# Patient Record
Sex: FEMALE | Race: WHITE
[De-identification: names, ages, dates, MRNs, and addresses within clinical notes are randomized per-mention and may not be internally consistent; named-entity substitution may affect disease eponyms.]

---

## 2019-10-20 ENCOUNTER — HOSPITAL ENCOUNTER (EMERGENCY)
Dept: HOSPITAL 61 - ER | Age: 49
Discharge: HOME | End: 2019-10-20
Payer: COMMERCIAL

## 2019-10-20 VITALS — BODY MASS INDEX: 28.93 KG/M2 | WEIGHT: 180 LBS | HEIGHT: 66 IN

## 2019-10-20 VITALS — DIASTOLIC BLOOD PRESSURE: 77 MMHG | SYSTOLIC BLOOD PRESSURE: 129 MMHG
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
## 2019-10-20 DIAGNOSIS — S82.62XA: Primary | ICD-10-CM

## 2019-10-20 PROCEDURE — 73610 X-RAY EXAM OF ANKLE: CPT

## 2019-10-20 PROCEDURE — 29515 APPLICATION SHORT LEG SPLINT: CPT

## 2019-10-20 PROCEDURE — 73630 X-RAY EXAM OF FOOT: CPT

## 2019-10-20 NOTE — RAD
Three views ANKLE LEFT 3V, FOOT LEFT 3V

 

Clinical History: Pain and swelling

 

Comparison: None.

 

3 views left foot:

 

The visualized osseous structures appear normal.

 

 

Impression:  

 

No acute findings.

 

End impression

 

3 views left ankle:

 

There is a fracture of the lateral malleolus below the level of the 

plafond. There is an avulsion from the tip of the medial malleolus. There 

is soft tissue swelling. The tibiotalar relationship is normal.

 

IMPRESSION:

 

Fracture of the lateral malleolus and avulsion of the tip of the medial 

malleolus.

 

Electronically signed by: Darek Brown III, MD (10/20/2019 12:04 PM) 

[facility]

## 2019-10-20 NOTE — RAD
Three views ANKLE LEFT 3V, FOOT LEFT 3V

 

Clinical History: Pain and swelling

 

Comparison: None.

 

3 views left foot:

 

The visualized osseous structures appear normal.

 

 

Impression:  

 

No acute findings.

 

End impression

 

3 views left ankle:

 

There is a fracture of the lateral malleolus below the level of the 

plafond. There is an avulsion from the tip of the medial malleolus. There 

is soft tissue swelling. The tibiotalar relationship is normal.

 

IMPRESSION:

 

Fracture of the lateral malleolus and avulsion of the tip of the medial 

malleolus.

 

Electronically signed by: Darek Brown III, MD (10/20/2019 12:04 PM) 

[facility]

## 2019-11-18 ENCOUNTER — HOSPITAL ENCOUNTER (EMERGENCY)
Dept: HOSPITAL 61 - ER | Age: 49
Discharge: HOME | End: 2019-11-18
Payer: COMMERCIAL

## 2019-11-18 VITALS — WEIGHT: 180 LBS | BODY MASS INDEX: 28.93 KG/M2 | HEIGHT: 66 IN

## 2019-11-18 VITALS — DIASTOLIC BLOOD PRESSURE: 70 MMHG | SYSTOLIC BLOOD PRESSURE: 119 MMHG

## 2019-11-18 DIAGNOSIS — L24.9: Primary | ICD-10-CM

## 2019-11-18 LAB
ALBUMIN SERPL-MCNC: 3.9 G/DL (ref 3.4–5)
ALBUMIN/GLOB SERPL: 0.8 {RATIO} (ref 1–1.7)
ALP SERPL-CCNC: 48 U/L (ref 46–116)
ALT SERPL-CCNC: 16 U/L (ref 14–59)
ANION GAP SERPL CALC-SCNC: 11 MMOL/L (ref 6–14)
AST SERPL-CCNC: 16 U/L (ref 15–37)
BASOPHILS # BLD AUTO: 0.1 X10^3/UL (ref 0–0.2)
BASOPHILS NFR BLD: 1 % (ref 0–3)
BILIRUB SERPL-MCNC: 0.4 MG/DL (ref 0.2–1)
BUN SERPL-MCNC: 9 MG/DL (ref 7–20)
BUN/CREAT SERPL: 13 (ref 6–20)
CALCIUM SERPL-MCNC: 9 MG/DL (ref 8.5–10.1)
CHLORIDE SERPL-SCNC: 101 MMOL/L (ref 98–107)
CO2 SERPL-SCNC: 26 MMOL/L (ref 21–32)
CREAT SERPL-MCNC: 0.7 MG/DL (ref 0.6–1)
EOSINOPHIL NFR BLD: 0.1 X10^3/UL (ref 0–0.7)
EOSINOPHIL NFR BLD: 2 % (ref 0–3)
ERYTHROCYTE [DISTWIDTH] IN BLOOD BY AUTOMATED COUNT: 13.7 % (ref 11.5–14.5)
GFR SERPLBLD BASED ON 1.73 SQ M-ARVRAT: 88.6 ML/MIN
GLOBULIN SER-MCNC: 4.8 G/DL (ref 2.2–3.8)
GLUCOSE SERPL-MCNC: 95 MG/DL (ref 70–99)
HCT VFR BLD CALC: 37.3 % (ref 36–47)
HGB BLD-MCNC: 12.5 G/DL (ref 12–15.5)
LYMPHOCYTES # BLD: 1.6 X10^3/UL (ref 1–4.8)
LYMPHOCYTES NFR BLD AUTO: 27 % (ref 24–48)
MCH RBC QN AUTO: 30 PG (ref 25–35)
MCHC RBC AUTO-ENTMCNC: 34 G/DL (ref 31–37)
MCV RBC AUTO: 91 FL (ref 79–100)
MONO #: 0.6 X10^3/UL (ref 0–1.1)
MONOCYTES NFR BLD: 11 % (ref 0–9)
NEUT #: 3.5 X10^3/UL (ref 1.8–7.7)
NEUTROPHILS NFR BLD AUTO: 59 % (ref 31–73)
PLATELET # BLD AUTO: 350 X10^3/UL (ref 140–400)
POTASSIUM SERPL-SCNC: 4.2 MMOL/L (ref 3.5–5.1)
PROT SERPL-MCNC: 8.7 G/DL (ref 6.4–8.2)
RBC # BLD AUTO: 4.11 X10^6/UL (ref 3.5–5.4)
SODIUM SERPL-SCNC: 138 MMOL/L (ref 136–145)
WBC # BLD AUTO: 5.9 X10^3/UL (ref 4–11)

## 2019-11-18 PROCEDURE — 80053 COMPREHEN METABOLIC PANEL: CPT

## 2019-11-18 PROCEDURE — 85025 COMPLETE CBC W/AUTO DIFF WBC: CPT

## 2019-11-18 PROCEDURE — 36415 COLL VENOUS BLD VENIPUNCTURE: CPT

## 2019-11-18 PROCEDURE — 93971 EXTREMITY STUDY: CPT

## 2019-11-18 NOTE — PHYS DOC
Past Medical History


Past Medical History:  Other


Additional Past Medical Histor:  crohn's disease


Past Surgical History:  Other


Additional Past Surgical Histo:  LT ANKLE 11/1/19


Alcohol Use:  Occasionally


Drug Use:  None





Adult General


Chief Complaint


Chief Complaint:  SKIN RASH/ABSCESS





[facility]


HPI





Patient is a 49  year old female who presents to the ED today complaining of a 

rash on the left lower extremity that she noted yesterday. Patient reports 

having left ankle surgery on November 1, 2019. She does not have any 

recollection of anything that could've caused this rash. Denies any fever. 

Patient herself requesting venous Doppler of the left lower extremity.





Review of Systems


Review of Systems





Constitutional: Denies fever or chills []


Eyes: Denies change in visual acuity, redness, or eye pain []


HENT: Denies nasal congestion or sore throat []


Respiratory: Denies cough or shortness of breath []


Cardiovascular: No additional information not addressed in HPI []


GI: Denies abdominal pain, nausea, vomiting, bloody stools or diarrhea []


: Denies dysuria or hematuria []


Musculoskeletal: Denies back pain or joint pain []


Integument: Reports rash on the left lower extremity


Neurologic: Denies headache, focal weakness or sensory changes []








All other systems were reviewed and found to be within normal limits, except as 

documented in this note.





Allergies


Allergies





Allergies








Coded Allergies Type Severity Reaction Last Updated Verified


 


  No Known Drug Allergies    10/20/19 No











Physical Exam


Physical Exam





Constitutional: Well developed, well nourished, no acute distress, non-toxic 

appearance. []


HENT: Normocephalic, atraumatic, bilateral external ears normal, oropharynx 

moist, no oral exudates, nose normal. []


Eyes: PERRLA, EOMI, conjunctiva normal, no discharge. [] 


Neck: Normal range of motion, no tenderness, supple, no stridor. [] 


Cardiovascular:Heart rate regular rhythm, no murmur []


Lungs & Thorax:  Bilateral breath sounds clear to auscultation []


Abdomen: Bowel sounds normal, soft, no tenderness, no masses, no pulsatile 

masses. [] 


Skin: Warm, dry, left lower extremity surgical site on the medial and lateral 

aspect of the ankle is clean and dry, Steri-Strips over the laceration site. No 

signs of infection. Patient has mild amount of erythematous papular rash on the 

shin, left inner thigh and right lateral thigh. Rash suspicious of contact 

dermatitis. Rash is not warm. Rash is not tender to touch.


Back: No tenderness, no CVA tenderness. [] 


Extremities: See skin for further documentation. No tenderness, no cyanosis, no 

clubbing, ROM intact, no edema. +2 left pedal pulse.


Neurologic: Alert and oriented X 3, normal motor function, normal sensory 

function, no focal deficits noted. []


Psychologic: Affect normal, judgement normal, mood normal. []





Current Patient Data


Vital Signs





                                   Vital Signs








  Date Time  Temp Pulse Resp B/P (MAP) Pulse Ox O2 Delivery O2 Flow Rate FiO2


 


11/18/19 11:30 97.7 71 16 119/70 (86) 98 Room Air  





 97.7       








Lab Values





                                Laboratory Tests








Test


 11/18/19


14:11


 


White Blood Count


 5.9 x10^3/uL


(4.0-11.0)


 


Red Blood Count


 4.11 x10^6/uL


(3.50-5.40)


 


Hemoglobin


 12.5 g/dL


(12.0-15.5)


 


Hematocrit


 37.3 %


(36.0-47.0)


 


Mean Corpuscular Volume


 91 fL ()





 


Mean Corpuscular Hemoglobin 30 pg (25-35)  


 


Mean Corpuscular Hemoglobin


Concent 34 g/dL


(31-37)


 


Red Cell Distribution Width


 13.7 %


(11.5-14.5)


 


Platelet Count


 350 x10^3/uL


(140-400)


 


Neutrophils (%) (Auto) 59 % (31-73)  


 


Lymphocytes (%) (Auto) 27 % (24-48)  


 


Monocytes (%) (Auto) 11 % (0-9)  H


 


Eosinophils (%) (Auto) 2 % (0-3)  


 


Basophils (%) (Auto) 1 % (0-3)  


 


Neutrophils # (Auto)


 3.5 x10^3/uL


(1.8-7.7)


 


Lymphocytes # (Auto)


 1.6 x10^3/uL


(1.0-4.8)


 


Monocytes # (Auto)


 0.6 x10^3/uL


(0.0-1.1)


 


Eosinophils # (Auto)


 0.1 x10^3/uL


(0.0-0.7)


 


Basophils # (Auto)


 0.1 x10^3/uL


(0.0-0.2)


 


Sodium Level


 138 mmol/L


(136-145)


 


Potassium Level


 4.2 mmol/L


(3.5-5.1)


 


Chloride Level


 101 mmol/L


()


 


Carbon Dioxide Level


 26 mmol/L


(21-32)


 


Anion Gap 11 (6-14)  


 


Blood Urea Nitrogen


 9 mg/dL (7-20)





 


Creatinine


 0.7 mg/dL


(0.6-1.0)


 


Estimated GFR


(Cockcroft-Gault) 88.6  





 


BUN/Creatinine Ratio 13 (6-20)  


 


Glucose Level


 95 mg/dL


(70-99)


 


Calcium Level


 9.0 mg/dL


(8.5-10.1)


 


Total Bilirubin


 0.4 mg/dL


(0.2-1.0)


 


Aspartate Amino Transferase


(AST) 16 U/L (15-37)





 


Alanine Aminotransferase (ALT)


 16 U/L (14-59)





 


Alkaline Phosphatase


 48 U/L


()


 


Total Protein


 8.7 g/dL


(6.4-8.2)  H


 


Albumin


 3.9 g/dL


(3.4-5.0)


 


Albumin/Globulin Ratio


 0.8 (1.0-1.7)


L





                                Laboratory Tests


11/18/19 14:11








                                Laboratory Tests


11/18/19 14:11














EKG


EKG


[]





Radiology/Procedures


Radiology/Procedures


[]PROCEDURE: VENOUS LOWER EXTREMITY LEFT





 


Examination:  Left Lower Extremity Venous Doppler Ultrasound


 


History: Lower extremity pain, rash


 


Comparison: None


 


Procedure: Gray scale, color flow 2D and spectal waveform analysis images 


are obtained with and without compression in the area of the common 


femoral vein, superficial femoral vein - femoral vein junction, main 


femoral vein (superficial femoral vein) and popliteal vein. Veins of the 


proximal calf are also imaged.


 


Findings:


 


There is normal duplex flow, color flow and compressibility of all 


visualized vein segments. No evidence of deep venous thrombus is present. 


2.3 cm lymph node identified in the left groin.


 


Impression: 


 


No evidence of DVT in the left lower extremity venous system.


 


Electronically signed by: Reuben Muñiz MD (11/18/2019 1:02 PM) 


[facility]














DICTATED and SIGNED BY:     REUBEN MUÑIZ MD


DATE:     11/18/19 6226





Course & Med Decision Making


Course & Med Decision Making


Pertinent Labs and Imaging studies reviewed. (See chart for details)





This is a 49-year-old female patient presenting to the ED today with a rash on 

the left lower extremity that began yesterday. The rash appears consistent with 

an allergic reaction/contact dermatitis type of rash. Patient did have left 

ankle surgery on November 1, 2019. Surgical site is clean and dry well 

approximated no signs of infection.





Patient herself is requesting venous Doppler of the left lower extremity-venous 

Doppler is negative





After the Doppler patient requested labs which were done, labs are negative.





Contacted  who requested we d/c her to home. Given Rx  for prednisone. 

F/u Dr. Whitehead next week.





Dragon Disclaimer


Dragon Disclaimer


This electronic medical record was generated, in whole or in part, using a voice

 recognition dictation system.





Departure


Departure


Impression:  


   Primary Impression:  


   Contact dermatitis


Disposition:  01 HOME, SELF-CARE


Condition:  STABLE


Referrals:  


UNKNOWN PCP NAME (PCP)


Follow-up with your doctor next week


Patient Instructions:  Rash, Easy-to-Read





Additional Instructions:  


You were evaluated in the emergency room with a rash, we sent a prescription for

 prednisone to your pharmacy, take it as prescribed. Consider taking Benadryl as

 well and Pepcid. Come back to the ED at any point symptoms worsen.


Scripts


Prednisone (PREDNISONE) 50 Mg Tablet


1 TAB PO DAILY, #5 TAB


   Prov: FILEMON DURAN         11/18/19





Problem Qualifiers








   Primary Impression:  


   Contact dermatitis


   Contact dermatitis type:  irritant  Contact dermatitis trigger:  unspecified 

   trigger  Qualified Codes:  L24.9 - Irritant contact dermatitis, unspecified 

   cause








FILEMON DURAN              Nov 18, 2019 15:28

## 2019-11-18 NOTE — RAD
Examination:  Left Lower Extremity Venous Doppler Ultrasound

 

History: Lower extremity pain, rash

 

Comparison: None

 

Procedure: Gray scale, color flow 2D and spectal waveform analysis images 

are obtained with and without compression in the area of the common 

femoral vein, superficial femoral vein - femoral vein junction, main 

femoral vein (superficial femoral vein) and popliteal vein. Veins of the 

proximal calf are also imaged.

 

Findings:

 

There is normal duplex flow, color flow and compressibility of all 

visualized vein segments. No evidence of deep venous thrombus is present. 

2.3 cm lymph node identified in the left groin.

 

Impression: 

 

No evidence of DVT in the left lower extremity venous system.

 

Electronically signed by: Reuben Muñiz MD (11/18/2019 1:02 PM) 

[facility]